# Patient Record
Sex: MALE | Race: WHITE | NOT HISPANIC OR LATINO | Employment: OTHER | URBAN - METROPOLITAN AREA
[De-identification: names, ages, dates, MRNs, and addresses within clinical notes are randomized per-mention and may not be internally consistent; named-entity substitution may affect disease eponyms.]

---

## 2017-09-26 NOTE — PATIENT DISCUSSION
Continue: PreserVision AREDS 2 (vit c,h-rs-gxjjh-lutein-zeaxan): capsule: [id number] mg-unit-mg-mg 1 capsule twice a day as directed by mouth

## 2017-09-26 NOTE — PATIENT DISCUSSION
AMD (DRY), Ou___:  PRESCRIBE AREDS 2 VITAMINS / AMSLER GRID QD/ UV PROTECTION. SMOKING CESSATION EMPHASIZED. RETURN FOR FOLLOW-UP AS SCHEDULED.

## 2017-09-26 NOTE — PATIENT DISCUSSION
Dry Eye Syndrome Counseling: I have discussed the diagnosis and the pathophysiology of this disease with the patient. . Vision may be limited by dry eye, and symptoms exacerbated by environmental factors such as smoke, wind, or prolonged eye use. Treatment options include, but are not limited to, artificial tears, punctal plugs, topical and cyclosporine  I stressed the importance of compliance with treatment.

## 2017-09-26 NOTE — PATIENT DISCUSSION
Blepharitis Counseling:   I have explained the diagnosis of blepharitis and successful management is dependent on patient compliance with the treatment regimen. I instructed the patient on using warm compresses and eyelid scrubs. I also instructed the patient on using artificial tears two to four times per day. Return for follow-up as scheduled or sooner if symptoms worsen.

## 2017-09-26 NOTE — PATIENT DISCUSSION
BLEPHARITIS, OU: PRESCRIBE WARM COMPRESSES AND EYELID SCRUBS QD-BID, ARTIFICIAL TEARS BID-QID AND ANTIBIOTIC OINTMENT. RETURN FOR FOLLOW-UP AS SCHEDULED.

## 2018-10-02 NOTE — PATIENT DISCUSSION
New Prescription: neomycin-polymyxin-dexameth (neomycin-polymyxin-dexameth): ointment: 3.5-10,000-0.1 mg-unit/g-% a small amount at bedtime into both eyes 10-

## 2019-04-02 NOTE — PATIENT DISCUSSION
Continue: neomycin-polymyxin-dexameth (neomycin-polymyxin-dexameth): ointment: 3.5-10,000-0.1 mg-unit/g-% a small amount at bedtime as needed into both eyes 10-

## 2019-04-02 NOTE — PATIENT DISCUSSION
POSTERIOR CAPSULAR FIBROSIS, Ou___ - VISUALLY SIGNIFICANT. SCHEDULE YAG CAPSULOTOMY Os___ FIRST THEN LATER YAG CAPSULOTOMY Od____ IF VISUAL SYMPTOMS PERSIST.

## 2019-04-02 NOTE — PATIENT DISCUSSION
Posterior Capsular Fibrosis Counseling: The diagnosis of posterior capsular fibrosis (PCF), also referred to as a secondary cataract or posterior capsular opacification (PCO), was discussed with the patient. The patient understands that their symptoms and limitations are likely related to this condition. I have reviewed the risks, benefits and alternatives of  YAG laser surgery for the treatment of the fibrosis. The uncommon risk of an increase in intraocular pressure or a retinal detachment and their associated symptoms were explained to the patient. The patient understands and desires to proceed with the laser surgery to improve their vision for __TV_________.

## 2019-05-13 NOTE — PATIENT DISCUSSION
Pre-Op 2nd Eye Yag Counseling: The patient has noticed an improvement in their visual symptoms in the operative eye. The patient complains of decreased vision in the fellow eye when ___watch tv__. It was explained to the patient that the decision to proceed with Yag laser in the fellow eye is entirely a separate decision from the surgical eye. All of the same risks, benefits and alternatives ere reviewed with the patient again. The patient does feel the vision in the non-operative eye is limiting their daily activities and elects to proceed with Yag laser in the _right______ eye.

## 2020-04-20 NOTE — PATIENT DISCUSSION
New Prescription: erythromycin (erythromycin): ointment: 5 mg/gram (0.5 %) a small amount at bedtime on eyelid 04-

## 2020-05-11 NOTE — PATIENT DISCUSSION
Continue: erythromycin (erythromycin): ointment: 5 mg/gram (0.5 %) a small amount at bedtime on eyelid 04-

## 2020-10-28 NOTE — PATIENT DISCUSSION
Continue: PreserVision AREDS (vitamins  a,c,e-zinc-copper): tablet: [id number] unit-mg-unit once a day

## 2021-05-11 NOTE — PATIENT DISCUSSION
OHTN, OU:  INTRAOCULAR PRESSURE IS WITHIN ACCEPTABLE LIMITS. PATIENT INSTRUCTED TO CONTINUE NO DROPS AND RETURN FOR FOLLOW-UP AS SCHEDULED.

## 2022-06-09 ENCOUNTER — PREPPED CHART (OUTPATIENT)
Dept: URBAN - METROPOLITAN AREA CLINIC 50 | Facility: CLINIC | Age: 67
End: 2022-06-09

## 2022-06-27 ENCOUNTER — NEW PATIENT (OUTPATIENT)
Dept: URBAN - METROPOLITAN AREA CLINIC 50 | Facility: CLINIC | Age: 67
End: 2022-06-27

## 2022-06-27 DIAGNOSIS — H25.813: ICD-10-CM

## 2022-06-27 DIAGNOSIS — H35.033: ICD-10-CM

## 2022-06-27 DIAGNOSIS — R73.03: ICD-10-CM

## 2022-06-27 DIAGNOSIS — L57.0: ICD-10-CM

## 2022-06-27 PROCEDURE — 92004 COMPRE OPH EXAM NEW PT 1/>: CPT

## 2022-06-27 PROCEDURE — 92134 CPTRZ OPH DX IMG PST SGM RTA: CPT

## 2022-06-27 PROCEDURE — 92015 DETERMINE REFRACTIVE STATE: CPT

## 2022-06-27 ASSESSMENT — KERATOMETRY
OD_K1POWER_DIOPTERS: 43.25
OS_K1POWER_DIOPTERS: 43.75
OD_AXISANGLE2_DEGREES: 39
OS_AXISANGLE_DEGREES: 0
OD_K2POWER_DIOPTERS: 44.00
OD_AXISANGLE_DEGREES: 129
OS_K2POWER_DIOPTERS: 43.75
OS_AXISANGLE2_DEGREES: 90

## 2022-06-27 ASSESSMENT — VISUAL ACUITY
OD_GLARE: 20/50
OU_SC: 20/40
OU_CC: J1
OS_SC: 20/40
OD_PH: 20/30-1
OD_SC: 20/60-1
OD_GLARE: 20/70
OS_GLARE: 20/40
OS_GLARE: 20/70

## 2022-06-27 ASSESSMENT — TONOMETRY
OD_IOP_MMHG: 15
OS_IOP_MMHG: 15

## 2022-07-26 ENCOUNTER — PRE-OP/H&P (OUTPATIENT)
Dept: URBAN - METROPOLITAN AREA CLINIC 49 | Facility: CLINIC | Age: 67
End: 2022-07-26

## 2022-07-26 DIAGNOSIS — H25.813: ICD-10-CM

## 2022-07-26 PROCEDURE — 92025IOL CORNEAL TOPOGRAPHY PREMIUM IOL

## 2022-07-26 PROCEDURE — PREOP PRE OP VISIT

## 2022-07-26 PROCEDURE — 92136 OPHTHALMIC BIOMETRY: CPT

## 2022-07-26 ASSESSMENT — KERATOMETRY
OD_K1POWER_DIOPTERS: 43.87
OD_AXISANGLE2_DEGREES: 142
OS_K2POWER_DIOPTERS: 43.25
OD_K2POWER_DIOPTERS: 43.12
OS_K1POWER_DIOPTERS: 43.75
OS_AXISANGLE_DEGREES: 155
OS_AXISANGLE2_DEGREES: 65
OD_AXISANGLE_DEGREES: 052

## 2022-07-26 ASSESSMENT — VISUAL ACUITY
OD_SC: 20/70+2
OD_PH: 20/40
OS_SC: 20/40

## 2022-07-26 ASSESSMENT — TONOMETRY
OS_IOP_MMHG: 16
OD_IOP_MMHG: 15

## 2022-08-04 ENCOUNTER — SURGERY/PROCEDURE (OUTPATIENT)
Dept: URBAN - METROPOLITAN AREA SURGERY 16 | Facility: SURGERY | Age: 67
End: 2022-08-04

## 2022-08-04 ENCOUNTER — POST-OP (OUTPATIENT)
Dept: URBAN - METROPOLITAN AREA CLINIC 50 | Facility: CLINIC | Age: 67
End: 2022-08-04

## 2022-08-04 DIAGNOSIS — H25.811: ICD-10-CM

## 2022-08-04 DIAGNOSIS — Z98.41: ICD-10-CM

## 2022-08-04 DIAGNOSIS — Z96.1: ICD-10-CM

## 2022-08-04 PROCEDURE — 66984 XCAPSL CTRC RMVL W/O ECP: CPT

## 2022-08-04 PROCEDURE — 99024 POSTOP FOLLOW-UP VISIT: CPT

## 2022-08-04 ASSESSMENT — TONOMETRY: OD_IOP_MMHG: 25

## 2022-08-04 ASSESSMENT — KERATOMETRY
OS_K2POWER_DIOPTERS: 43.25
OS_AXISANGLE2_DEGREES: 65
OD_AXISANGLE_DEGREES: 052
OS_AXISANGLE2_DEGREES: 65
OS_K1POWER_DIOPTERS: 43.75
OS_AXISANGLE_DEGREES: 155
OD_K2POWER_DIOPTERS: 43.12
OD_K1POWER_DIOPTERS: 43.87
OD_AXISANGLE2_DEGREES: 142
OD_K1POWER_DIOPTERS: 43.87
OS_K2POWER_DIOPTERS: 43.25
OD_AXISANGLE2_DEGREES: 142
OD_K2POWER_DIOPTERS: 43.12
OD_AXISANGLE_DEGREES: 052
OS_AXISANGLE_DEGREES: 155
OS_K1POWER_DIOPTERS: 43.75

## 2022-08-04 ASSESSMENT — VISUAL ACUITY: OD_SC: 20/50

## 2022-08-10 ENCOUNTER — POST OP/EVAL OF SECOND EYE (OUTPATIENT)
Dept: URBAN - METROPOLITAN AREA CLINIC 53 | Facility: CLINIC | Age: 67
End: 2022-08-10

## 2022-08-10 DIAGNOSIS — H25.812: ICD-10-CM

## 2022-08-10 DIAGNOSIS — Z98.41: ICD-10-CM

## 2022-08-10 PROCEDURE — 92136 - 2N OPHTHALMIC BIOMETRY BY PARTIAL COHERENCE INTERFEROMETRY WITH INTRAOCULAR LENS POWER CALCULATION

## 2022-08-10 PROCEDURE — 99213 OFFICE O/P EST LOW 20 MIN: CPT

## 2022-08-10 ASSESSMENT — KERATOMETRY
OD_AXISANGLE2_DEGREES: 142
OS_K2POWER_DIOPTERS: 43.25
OD_AXISANGLE_DEGREES: 052
OS_K1POWER_DIOPTERS: 43.75
OD_K1POWER_DIOPTERS: 43.87
OD_K2POWER_DIOPTERS: 43.12
OS_AXISANGLE2_DEGREES: 65
OS_AXISANGLE_DEGREES: 155

## 2022-08-10 ASSESSMENT — TONOMETRY
OD_IOP_MMHG: 21
OS_IOP_MMHG: 22

## 2022-08-10 ASSESSMENT — VISUAL ACUITY
OD_SC: 20/25-2
OS_SC: 20/30

## 2022-08-18 ENCOUNTER — SURGERY/PROCEDURE (OUTPATIENT)
Dept: URBAN - METROPOLITAN AREA SURGERY 16 | Facility: SURGERY | Age: 67
End: 2022-08-18

## 2022-08-18 ENCOUNTER — POST-OP (OUTPATIENT)
Dept: URBAN - METROPOLITAN AREA CLINIC 50 | Facility: CLINIC | Age: 67
End: 2022-08-18

## 2022-08-18 DIAGNOSIS — H25.812: ICD-10-CM

## 2022-08-18 DIAGNOSIS — Z96.1: ICD-10-CM

## 2022-08-18 DIAGNOSIS — Z98.42: ICD-10-CM

## 2022-08-18 PROCEDURE — 66984 XCAPSL CTRC RMVL W/O ECP: CPT

## 2022-08-18 ASSESSMENT — TONOMETRY
OS_IOP_MMHG: 22
OD_IOP_MMHG: 18

## 2022-08-18 ASSESSMENT — KERATOMETRY
OD_K2POWER_DIOPTERS: 43.12
OD_AXISANGLE2_DEGREES: 142
OD_K1POWER_DIOPTERS: 43.87
OS_K2POWER_DIOPTERS: 43.25
OD_K1POWER_DIOPTERS: 43.87
OD_AXISANGLE_DEGREES: 052
OD_AXISANGLE2_DEGREES: 142
OS_K1POWER_DIOPTERS: 43.75
OD_K2POWER_DIOPTERS: 43.12
OD_AXISANGLE_DEGREES: 052
OS_AXISANGLE2_DEGREES: 65
OS_K1POWER_DIOPTERS: 43.75
OS_AXISANGLE2_DEGREES: 65
OS_AXISANGLE_DEGREES: 155
OS_K2POWER_DIOPTERS: 43.25
OS_AXISANGLE_DEGREES: 155

## 2022-08-18 ASSESSMENT — VISUAL ACUITY
OD_SC: 20/20-2
OS_SC: 20/40

## 2022-08-22 ENCOUNTER — POST-OP (OUTPATIENT)
Dept: URBAN - METROPOLITAN AREA CLINIC 50 | Facility: CLINIC | Age: 67
End: 2022-08-22

## 2022-08-22 DIAGNOSIS — Z98.41: ICD-10-CM

## 2022-08-22 DIAGNOSIS — Z98.42: ICD-10-CM

## 2022-08-22 PROCEDURE — 99024 POSTOP FOLLOW-UP VISIT: CPT

## 2022-08-22 ASSESSMENT — TONOMETRY
OD_IOP_MMHG: 14
OS_IOP_MMHG: 20

## 2022-08-22 ASSESSMENT — VISUAL ACUITY
OS_SC: 20/30
OD_SC: 20/40
OD_PH: 20/25-1

## 2022-09-12 ENCOUNTER — POST-OP (OUTPATIENT)
Dept: URBAN - METROPOLITAN AREA CLINIC 50 | Facility: CLINIC | Age: 67
End: 2022-09-12

## 2022-09-12 DIAGNOSIS — Z98.41: ICD-10-CM

## 2022-09-12 DIAGNOSIS — Z98.42: ICD-10-CM

## 2022-09-12 PROCEDURE — 99024 POSTOP FOLLOW-UP VISIT: CPT

## 2022-09-12 ASSESSMENT — TONOMETRY
OD_IOP_MMHG: 15
OS_IOP_MMHG: 17

## 2022-09-12 ASSESSMENT — VISUAL ACUITY
OD_SC: 20/30
OU_SC: 20/30
OS_SC: 20/30
OD_PH: 20/25

## 2023-10-16 ENCOUNTER — CONSULTATION/EVALUATION (OUTPATIENT)
Dept: URBAN - METROPOLITAN AREA CLINIC 50 | Facility: LOCATION | Age: 68
End: 2023-10-16

## 2023-10-16 DIAGNOSIS — H26.493: ICD-10-CM

## 2023-10-16 PROCEDURE — 66821 AFTER CATARACT LASER SURGERY: CPT

## 2023-10-16 PROCEDURE — 99214 OFFICE O/P EST MOD 30 MIN: CPT | Mod: 57

## 2023-10-16 RX ORDER — PREDNISOLONE ACETATE 10 MG/ML: 1 SUSPENSION/ DROPS OPHTHALMIC TWICE A DAY

## 2023-10-16 ASSESSMENT — TONOMETRY
OS_IOP_MMHG: 18
OD_IOP_MMHG: 16

## 2023-10-16 ASSESSMENT — VISUAL ACUITY
OU_SC: 20/25
OS_SC: 20/30-2
OD_SC: 20/25-1

## 2023-11-06 ENCOUNTER — CLINIC PROCEDURE ONLY (OUTPATIENT)
Dept: URBAN - METROPOLITAN AREA CLINIC 50 | Facility: LOCATION | Age: 68
End: 2023-11-06

## 2023-11-06 DIAGNOSIS — H26.492: ICD-10-CM

## 2023-11-06 PROCEDURE — 66821 AFTER CATARACT LASER SURGERY: CPT

## 2023-11-06 ASSESSMENT — VISUAL ACUITY
OS_SC: 20/40
OD_SC: 20/25+1
OS_PH: 20/25
OU_SC: 20/25+1

## 2023-11-06 ASSESSMENT — TONOMETRY
OD_IOP_MMHG: 19
OS_IOP_MMHG: 18

## 2023-11-27 ENCOUNTER — POST-OP (OUTPATIENT)
Dept: URBAN - METROPOLITAN AREA CLINIC 50 | Facility: LOCATION | Age: 68
End: 2023-11-27

## 2023-11-27 DIAGNOSIS — Z98.890: ICD-10-CM

## 2023-11-27 PROCEDURE — 99024 POSTOP FOLLOW-UP VISIT: CPT

## 2023-11-27 PROCEDURE — 92015GRNC REFRACTION GLASSES RECHECK NO CHARGE

## 2023-11-27 RX ORDER — TOBRAMYCIN AND DEXAMETHASONE 3; 1 MG/G; MG/G
1/2 OINTMENT OPHTHALMIC
Start: 2023-11-27

## 2023-11-27 ASSESSMENT — VISUAL ACUITY
OU_SC: 20/20-1
OS_SC: 20/25-1
OD_SC: 20/25
OU_SC: J2@16"

## 2023-11-27 ASSESSMENT — KERATOMETRY
OS_AXISANGLE_DEGREES: 099
OD_AXISANGLE2_DEGREES: 28
OS_K1POWER_DIOPTERS: 43.58
OD_K2POWER_DIOPTERS: 43.75
OD_K1POWER_DIOPTERS: 43.00
OS_K2POWER_DIOPTERS: 43.75
OD_AXISANGLE_DEGREES: 118
OS_AXISANGLE2_DEGREES: 9

## 2023-11-27 ASSESSMENT — TONOMETRY
OS_IOP_MMHG: 18
OD_IOP_MMHG: 18

## 2025-02-07 ENCOUNTER — COMPREHENSIVE EXAM (OUTPATIENT)
Age: 70
End: 2025-02-07

## 2025-02-07 DIAGNOSIS — L57.0: ICD-10-CM

## 2025-02-07 DIAGNOSIS — R73.03: ICD-10-CM

## 2025-02-07 DIAGNOSIS — H52.4: ICD-10-CM

## 2025-02-07 DIAGNOSIS — H35.033: ICD-10-CM

## 2025-02-07 PROCEDURE — 99214 OFFICE O/P EST MOD 30 MIN: CPT

## 2025-02-07 PROCEDURE — 92015 DETERMINE REFRACTIVE STATE: CPT
